# Patient Record
Sex: FEMALE | Race: OTHER | HISPANIC OR LATINO | URBAN - METROPOLITAN AREA
[De-identification: names, ages, dates, MRNs, and addresses within clinical notes are randomized per-mention and may not be internally consistent; named-entity substitution may affect disease eponyms.]

---

## 2018-06-27 ENCOUNTER — EMERGENCY (EMERGENCY)
Facility: HOSPITAL | Age: 43
LOS: 1 days | Discharge: HOME | End: 2018-06-27
Admitting: EMERGENCY MEDICINE
Payer: SELF-PAY

## 2018-06-27 VITALS
HEART RATE: 98 BPM | TEMPERATURE: 99 F | SYSTOLIC BLOOD PRESSURE: 125 MMHG | OXYGEN SATURATION: 98 % | RESPIRATION RATE: 16 BRPM | DIASTOLIC BLOOD PRESSURE: 65 MMHG

## 2018-06-27 PROCEDURE — 99284 EMERGENCY DEPT VISIT MOD MDM: CPT

## 2018-06-27 PROCEDURE — 93971 EXTREMITY STUDY: CPT | Mod: 26,LT

## 2018-06-27 PROCEDURE — 73610 X-RAY EXAM OF ANKLE: CPT | Mod: 26,LT

## 2018-06-27 PROCEDURE — 73590 X-RAY EXAM OF LOWER LEG: CPT | Mod: 26,LT

## 2018-06-27 PROCEDURE — 73630 X-RAY EXAM OF FOOT: CPT | Mod: 26,LT

## 2018-06-27 RX ORDER — ACETAMINOPHEN 500 MG
650 TABLET ORAL ONCE
Qty: 0 | Refills: 0 | Status: COMPLETED | OUTPATIENT
Start: 2018-06-27 | End: 2018-06-27

## 2018-06-27 RX ADMIN — Medication 650 MILLIGRAM(S): at 16:17

## 2018-06-27 NOTE — ED PROVIDER NOTE - MEDICAL DECISION MAKING DETAILS
Pt is a 43 y/o F nonsmoker no PMHx p/w shin pain x 1 week -- likely contusion, healing laceration w/o e/o infection, r/o dvt -- sono, xray, wound care

## 2018-06-27 NOTE — ED PROVIDER NOTE - CARE PLAN
Principal Discharge DX:	Contusion  Assessment and plan of treatment:	Advance activity as tolerated.  Continue all previously prescribed medications as directed unless otherwise instructed.  Keep wound clean and dry.  Apply bacitracin twice daily and cover with gauze until healed.  STOP taking Naproxen.  Take Motrin (also sold as Advil or Ibuprofen) 400-600 mg (two or three 200 mg over the counter pills) every 8 hours as needed for moderate pain or fevers-- take with food. Take Tylenol 650mg (Two 325 mg pills) every 4-6 hours as needed for pain or fevers. Keep extremity elevated and wrapped.  Apply cool compresses to affected area for 15 minutes, 3-4 times per day. Keep extremity elevated and wrapped.  Apply cool compresses to affected area for 15 minutes, 3-4 times per day. Follow up with your primary care physician in 48-72 hours- bring copies of your results.  Return to the ER for worsening or persistent symptoms, and/or ANY NEW OR CONCERNING SYMPTOMS. If you have issues obtaining follow up, please call: [phone] to obtain a doctor or specialist who takes your insurance in your area.  You may call [phone] to make an appointment with the internal medicine clinic.  Secondary Diagnosis:	Laceration of lower leg

## 2018-06-27 NOTE — ED PROVIDER NOTE - PROGRESS NOTE DETAILS
FELY HANLEY:  Wound cleansed.  Sono negative for dvt.  Xray negative for fracture.  Ace bandage applied.  Pt medically stable for discharge. FELY HANLEY:  Wound cleansed.  Sono negative for dvt.  Xray negative for fracture.  Ace bandage applied.  Pt medically stable for discharge.  Translation for discharge instructions provided by Génesis Meade RN.  Unable to use [facility]  after several attempts.  All questions answered.

## 2018-06-27 NOTE — ED PROCEDURE NOTE - GENERAL PROCEDURE DETAILS
Wound cleansed with chlorhexidine.  Bacitracin applied.  Covered with gauze and tape.  ACE bandage applied.

## 2018-06-27 NOTE — ED PROVIDER NOTE - LOWER EXTREMITY EXAM, RIGHT
mild swelling, bruising to right lateral shin; no redness; no warmth; no point tenderness; no joint laxity of ankle/foot

## 2018-06-27 NOTE — ED ADULT TRIAGE NOTE - CHIEF COMPLAINT QUOTE
Pt. c/o left ankle and foot pain with bruising and open wound after being stepped on by horse at [place] on Monday. Able to ambulate in triage.

## 2018-06-27 NOTE — ED PROVIDER NOTE - OBJECTIVE STATEMENT
Pt is a 43 y/o F nonsmoker no PMHx p/w shin pain x 1 week.  Pt speaks Romansh; [employer]  ID# [id number] used.  Pt states last week while at work a horse stepped on her right shin after which pt sustained a wound and has developed constant pain.  Pt has been taking Naproxen to mild relief.  Pain worsens with walking and standing.  Last tetanus 1/2017.  Pt notes numbness around wound.  Denies any fevers, chills, weakness, inability to walk, head injury, or any other complaints.

## 2018-06-27 NOTE — ED PROVIDER NOTE - PLAN OF CARE
Advance activity as tolerated.  Continue all previously prescribed medications as directed unless otherwise instructed.  Keep wound clean and dry.  Apply bacitracin twice daily and cover with gauze until healed.  STOP taking Naproxen.  Take Motrin (also sold as Advil or Ibuprofen) 400-600 mg (two or three 200 mg over the counter pills) every 8 hours as needed for moderate pain or fevers-- take with food. Take Tylenol 650mg (Two 325 mg pills) every 4-6 hours as needed for pain or fevers. Keep extremity elevated and wrapped.  Apply cool compresses to affected area for 15 minutes, 3-4 times per day. Keep extremity elevated and wrapped.  Apply cool compresses to affected area for 15 minutes, 3-4 times per day. Follow up with your primary care physician in 48-72 hours- bring copies of your results.  Return to the ER for worsening or persistent symptoms, and/or ANY NEW OR CONCERNING SYMPTOMS. If you have issues obtaining follow up, please call: [phone] to obtain a doctor or specialist who takes your insurance in your area.  You may call [phone] to make an appointment with the internal medicine clinic.